# Patient Record
Sex: MALE | URBAN - METROPOLITAN AREA
[De-identification: names, ages, dates, MRNs, and addresses within clinical notes are randomized per-mention and may not be internally consistent; named-entity substitution may affect disease eponyms.]

---

## 2020-12-20 ENCOUNTER — NURSE TRIAGE (OUTPATIENT)
Dept: OTHER | Facility: OTHER | Age: 40
End: 2020-12-20

## 2020-12-20 DIAGNOSIS — Z20.822 COVID-19 RULED OUT: Primary | ICD-10-CM

## 2020-12-21 DIAGNOSIS — Z20.822 COVID-19 RULED OUT: ICD-10-CM

## 2020-12-21 PROCEDURE — U0003 INFECTIOUS AGENT DETECTION BY NUCLEIC ACID (DNA OR RNA); SEVERE ACUTE RESPIRATORY SYNDROME CORONAVIRUS 2 (SARS-COV-2) (CORONAVIRUS DISEASE [COVID-19]), AMPLIFIED PROBE TECHNIQUE, MAKING USE OF HIGH THROUGHPUT TECHNOLOGIES AS DESCRIBED BY CMS-2020-01-R: HCPCS | Performed by: FAMILY MEDICINE

## 2020-12-22 LAB — SARS-COV-2 RNA SPEC QL NAA+PROBE: DETECTED

## 2020-12-29 ENCOUNTER — TELEPHONE (OUTPATIENT)
Dept: CARDIOLOGY CLINIC | Facility: CLINIC | Age: 40
End: 2020-12-29

## 2022-11-10 ENCOUNTER — OFFICE VISIT (OUTPATIENT)
Dept: URGENT CARE | Facility: CLINIC | Age: 42
End: 2022-11-10

## 2022-11-10 VITALS
HEIGHT: 72 IN | OXYGEN SATURATION: 96 % | SYSTOLIC BLOOD PRESSURE: 125 MMHG | HEART RATE: 86 BPM | BODY MASS INDEX: 33.86 KG/M2 | DIASTOLIC BLOOD PRESSURE: 81 MMHG | RESPIRATION RATE: 16 BRPM | TEMPERATURE: 96.5 F | WEIGHT: 250 LBS

## 2022-11-10 DIAGNOSIS — J02.9 PHARYNGITIS, UNSPECIFIED ETIOLOGY: Primary | ICD-10-CM

## 2022-11-10 DIAGNOSIS — K21.9 GASTROESOPHAGEAL REFLUX DISEASE WITHOUT ESOPHAGITIS: ICD-10-CM

## 2022-11-10 DIAGNOSIS — H10.33 ACUTE BACTERIAL CONJUNCTIVITIS OF BOTH EYES: ICD-10-CM

## 2022-11-10 LAB — S PYO AG THROAT QL: NEGATIVE

## 2022-11-10 RX ORDER — TOBRAMYCIN 3 MG/ML
2 SOLUTION/ DROPS OPHTHALMIC
Qty: 5 ML | Refills: 0 | Status: SHIPPED | OUTPATIENT
Start: 2022-11-10 | End: 2022-11-17

## 2022-11-10 RX ORDER — AMOXICILLIN 500 MG/1
500 CAPSULE ORAL EVERY 12 HOURS SCHEDULED
Qty: 14 CAPSULE | Refills: 0 | Status: SHIPPED | OUTPATIENT
Start: 2022-11-10 | End: 2022-11-17

## 2022-11-10 RX ORDER — TOBRAMYCIN 3 MG/ML
2 SOLUTION/ DROPS OPHTHALMIC
Qty: 5 ML | Refills: 0 | Status: SHIPPED | COMMUNITY
Start: 2022-11-10 | End: 2022-11-10

## 2022-11-10 NOTE — PROGRESS NOTES
[facility]        NAME: Radha Pop is a 38 y o  male  : 1980    MRN: [id number]  DATE: November 10, 2022  TIME: 11:02 AM    Assessment and Plan   Pharyngitis, unspecified etiology [J02 9]  1  Pharyngitis, unspecified etiology  POCT rapid strepA    amoxicillin (AMOXIL) 500 mg capsule    al mag oxide-diphenhydramine-lidocaine viscous (MAGIC MOUTHWASH) 1:1:1 suspension   2  Acute bacterial conjunctivitis of both eyes  tobramycin (TOBREX) 0 3 % SOLN    DISCONTINUED: tobramycin (TOBREX) 0 3 % SOLN   3  Gastroesophageal reflux disease without esophagitis       Discussed pharyngitis due to bacterial, allergies, or reflux  Patient Instructions   Medications as prescribed  Warm compress  Your consider contagious until you have been on antibiotics for 24 hours  Saltwater gargles  Warm tea with honey  Consider allergy and reflux medications  Reestablish care with PCP  Follow up with PCP in 3-5 days  Proceed to  ER if symptoms worsen  Chief Complaint     Chief Complaint   Patient presents with   • Sore Throat     Sore throat for past 7 days; placed on 3 days of steroids last dose was Saturday AM; pt states lymph nodes are swollen and it hurts to swallow   • Eye Problem     Bilateral itchy eyes, reddened, with discharge since last night         History of Present Illness       Patient is a 68-year-old male with no significant past medical history presents the office complaining of sore throat for 7 days  Pain is rated 7/10  States the pain has been providing him from sleeping at night     Denies fevers, congestion, rhinorrhea, nausea, vomiting, abdominal pain, or rashes  Patient does admit to clearing his throat, mild reflux, and belching  No known history of GERD  He was seen at an urgent care a few days ago and given 3 days of steroids which did not provide any help      States he also woke up with bilateral itchy eyes, redness, discharge, pasted shut reports he had a cough for approximately 1-2 days  Denies photophobia, pain, vision changes, foreign body sensation  Patient wears glasses and contacts but has not wears contacts for 1 week      Review of Systems   Review of Systems   Constitutional: Negative for chills and fever  HENT: Positive for sore throat and trouble swallowing  Negative for congestion, postnasal drip and rhinorrhea  Eyes: Positive for discharge, redness and itching  Negative for photophobia, pain and visual disturbance  Respiratory: Negative for cough and shortness of breath  Cardiovascular: Negative for chest pain and palpitations  Gastrointestinal: Negative for abdominal pain, diarrhea, nausea and vomiting  Musculoskeletal: Negative for myalgias  Neurological: Negative for dizziness, light-headedness and headaches           Current Medications       Current Outpatient Medications:   •  al mag oxide-diphenhydramine-lidocaine viscous (MAGIC MOUTHWASH) 1:1:1 suspension, Swish and spit 10 mL every 4 (four) hours as needed for mouth pain or discomfort, Disp: 90 mL, Rfl: 0  •  amoxicillin (AMOXIL) 500 mg capsule, Take 1 capsule (500 mg total) by mouth every 12 (twelve) hours for 7 days, Disp: 14 capsule, Rfl: 0  •  tobramycin (TOBREX) 0 3 % SOLN, Administer 2 drops to both eyes every 4 (four) hours while awake for 7 days, Disp: 5 mL, Rfl: 0    Current Allergies     Allergies as of 11/10/2022   • (No Known Allergies)            The following portions of the patient's history were reviewed and updated as appropriate: allergies, current medications, past family history, past medical history, past social history, past surgical history and problem list      Past Medical History:   Diagnosis Date   • Otitis media    • Strep throat        Past Surgical History:   Procedure Laterality Date   • ANKLE SURGERY Right    • CHOLECYSTECTOMY     • HERNIA REPAIR     • TONSILLECTOMY         Family History   Problem Relation Age of Onset   • No Known Problems Mother    • No Known Problems Father          Medications have been verified  Objective   /81   Pulse 86   Temp (!) 96 5 °F (35 8 °C)   Resp 16   Ht 6' (1 829 m)   Wt 113 kg (250 lb)   SpO2 96%   BMI 33 91 kg/m²   No LMP for male patient  Physical Exam     Physical Exam  Vitals and nursing note reviewed  Constitutional:       Appearance: Normal appearance  He is well-developed  HENT:      Head: Normocephalic and atraumatic  Right Ear: Tympanic membrane, ear canal and external ear normal       Left Ear: Tympanic membrane, ear canal and external ear normal       Nose: Nose normal       Mouth/Throat:      Pharynx: Uvula midline  Posterior oropharyngeal erythema present  No pharyngeal swelling or uvula swelling  Tonsils: No tonsillar exudate or tonsillar abscesses  Eyes:      General: Lids are normal  Vision grossly intact  Right eye: Discharge present  Left eye: Discharge present  Extraocular Movements: Extraocular movements intact  Conjunctiva/sclera:      Right eye: Right conjunctiva is injected  Left eye: Left conjunctiva is injected  Pupils: Pupils are equal, round, and reactive to light  Cardiovascular:      Rate and Rhythm: Normal rate and regular rhythm  Heart sounds: Normal heart sounds  No murmur heard  No friction rub  No gallop  Pulmonary:      Effort: Pulmonary effort is normal       Breath sounds: Normal breath sounds  No stridor  No wheezing or rales  Musculoskeletal:         General: Normal range of motion  Cervical back: Neck supple  Lymphadenopathy:      Cervical: Cervical adenopathy present  Skin:     General: Skin is warm and dry  Capillary Refill: Capillary refill takes less than 2 seconds  Neurological:      Mental Status: He is alert         POCT rapid strep negative

## 2022-11-10 NOTE — PATIENT INSTRUCTIONS
GERD (Gastroesophageal Reflux Disease)   WHAT YOU NEED TO KNOW:   Gastroesophageal reflux disease (GERD) is reflux that happens more than 2 times a week for a few weeks  Reflux means acid and food in your stomach back up into your esophagus  GERD can cause other health problems over time if it is not treated  DISCHARGE INSTRUCTIONS:   Call your local emergency number (911 in the [address] [facility] [facility]) if:   You have severe chest pain and sudden trouble breathing  Return to the emergency department if:   You have trouble breathing after you vomit  You have trouble swallowing, or pain with swallowing  Your bowel movements are black, bloody, or tarry-looking  Your vomit looks like coffee grounds or has blood in it  Call your doctor or gastroenterologist if:   You feel full and cannot burp or vomit  You vomit large amounts, or you vomit often  You are losing weight without trying  Your symptoms get worse or do not improve with treatment  You have questions or concerns about your condition or care  Medicines:   Medicines  are used to decrease stomach acid  Medicine may also be used to help your lower esophageal sphincter and stomach contract (tighten) more  Take your medicine as directed  Contact your healthcare provider if you think your medicine is not helping or if you have side effects  Tell him of her if you are allergic to any medicine  Keep a list of the medicines, vitamins, and herbs you take  Include the amounts, and when and why you take them  Bring the list or the pill bottles to follow-up visits  Carry your medicine list with you in case of an emergency  Manage GERD:       Do not have foods or drinks that may increase heartburn  These include chocolate, peppermint, fried or fatty foods, drinks that contain caffeine, or carbonated drinks (soda)  Other foods include spicy foods, onions, tomatoes, and tomato-based foods   Do not have foods or drinks that can irritate your esophagus, such as citrus fruits, juices, and alcohol  Do not eat large meals  When you eat a lot of food at one time, your stomach needs more acid to digest it  Eat 6 small meals each day instead of 3 large ones, and eat slowly  Do not eat meals 2 to 3 hours before bedtime  Elevate the head of your bed  Place 6-inch blocks under the head of your bed frame  You may also use more than one pillow under your head and shoulders while you sleep  Maintain a healthy weight  If you are overweight, weight loss may help relieve symptoms of GERD  Do not smoke  Smoking weakens the lower esophageal sphincter and increases the risk of GERD  Ask your healthcare provider for information if you currently smoke and need help to quit  E-cigarettes or smokeless tobacco still contain nicotine  Talk to your healthcare provider before you use these products  Do not put pressure on your abdomen  Pressure pushes acid up into your esophagus  Do not wear clothing that is tight around your waist  Do not bend over  Bend at the knees if you need to pick something up  Follow up with your doctor or gastroenterologist as directed:  Write down your questions so you remember to ask them during your visits  © Copyright [employer] 2022 Information is for End User's use only and may not be sold, redistributed or otherwise used for commercial purposes  All illustrations and images included in CareNotes® are the copyrighted property of [employer]  or Isiah Chapman  The above information is an  only  It is not intended as medical advice for individual conditions or treatments  Talk to your doctor, nurse or pharmacist before following any medical regimen to see if it is safe and effective for you

## 2022-11-10 NOTE — LETTER
November 10, 2022     Patient: Radha Pop   YOB: 1980   Date of Visit: 11/10/2022       To Whom It May Concern: It is my medical opinion that Radha Pop should remain out of work until 10/14/2022             Sincerely,        Cedrick Etienne PA-C

## 2024-05-28 ENCOUNTER — OFFICE VISIT (OUTPATIENT)
Dept: URGENT CARE | Facility: CLINIC | Age: 44
End: 2024-05-28
Payer: COMMERCIAL

## 2024-05-28 VITALS
TEMPERATURE: 98 F | DIASTOLIC BLOOD PRESSURE: 85 MMHG | OXYGEN SATURATION: 96 % | HEART RATE: 68 BPM | WEIGHT: 244 LBS | SYSTOLIC BLOOD PRESSURE: 134 MMHG | RESPIRATION RATE: 18 BRPM | BODY MASS INDEX: 32.34 KG/M2 | HEIGHT: 73 IN

## 2024-05-28 DIAGNOSIS — R11.2 NAUSEA AND VOMITING, UNSPECIFIED VOMITING TYPE: Primary | ICD-10-CM

## 2024-05-28 PROCEDURE — 99213 OFFICE O/P EST LOW 20 MIN: CPT

## 2024-05-28 RX ORDER — ONDANSETRON 4 MG/1
4 TABLET, FILM COATED ORAL EVERY 8 HOURS PRN
Qty: 20 TABLET | Refills: 0 | Status: SHIPPED | OUTPATIENT
Start: 2024-05-28

## 2024-05-28 NOTE — LETTER
May 29, 2024     Patient: Miguel Angel Acevedo   YOB: 1980   Date of Visit: 5/28/2024       To Whom it May Concern:    Miguel Angel Acevedo was seen in my clinic on 5/28/2024. He may return to work on 05/30/2024 .    If you have any questions or concerns, please don't hesitate to call.         Sincerely,          TESSIE Edwards        CC: No Recipients

## 2024-05-28 NOTE — PATIENT INSTRUCTIONS
Take zofran as prescribed  Fluids (pedialyte) and rest  Broths and clear soups  BRAT diet (bananas, rice, applesauce, and toast)  Progress to normal diet as tolerated  Avoid dairy while symptoms persist  Tylenol for pain/fever    Follow up with PCP in 3-5 days.  Proceed to  ER if symptoms worsen.    If tests are performed, our office will contact you with results only if changes need to made to the care plan discussed with you at the visit. You can review your full results on [facility].    Acute Nausea and Vomiting   WHAT YOU NEED TO KNOW:   Acute means the nausea and vomiting starts suddenly, gets worse quickly, and lasts a short time. There are many possible causes of acute nausea and vomiting.  DISCHARGE INSTRUCTIONS:   Call your local emergency number (911 in the [place]) if:   You have chest pain.    You have severe pain or cramping in your abdomen.    Your vision is blurred.    You are confused, have a high fever, or a stiff neck.    You have bright red blood coming from your rectum.    Your vomit smells like bowel movement.    Return to the emergency department if:   You have a severe headache or pain.    You are dizzy, cold, and thirsty, and your eyes and mouth are dry.    You are urinating very little or not at all.    You are dizzy or lightheaded when you stand up.    You see blood or material that looks like coffee grounds in your vomit.    Call your doctor if:   You continue to vomit for more than 48 hours.    Your nausea and vomiting does not get better or go away after you use medicine.    You have questions or concerns about your condition or care.    Medicines:  You may need any of the following:  Medicines  may be given to calm your stomach and stop your vomiting. You may also need medicines to help empty your stomach and bowels.    Take your medicine as directed.  Contact your healthcare provider if you think your medicine is not helping or if you have side effects. Tell your provider if you  are allergic to any medicine. Keep a list of the medicines, vitamins, and herbs you take. Include the amounts, and when and why you take them. Bring the list or the pill bottles to follow-up visits. Carry your medicine list with you in case of an emergency.    Manage your symptoms:   Rest as much as you can.  Too much activity can make your nausea worse.    Drink more liquids to prevent dehydration.  Take small sips. Try drinks such as ginger ale, lemonade, water, or tea. Your provider may recommend that you drink an oral rehydration solution (ORS). ORS contains water, salts, and sugar that are needed to replace the lost body fluids.    Eat smaller meals, more often.  Try bland foods and avoid spices or strong flavors    Do not drink alcohol.  Alcohol may upset or irritate your stomach.    Follow up with your doctor as directed:  Write down your questions so you remember to ask them during your visits.  © Copyright [employer] 2023 Information is for End User's use only and may not be sold, redistributed or otherwise used for commercial purposes.  The above information is an  only. It is not intended as medical advice for individual conditions or treatments. Talk to your doctor, nurse or pharmacist before following any medical regimen to see if it is safe and effective for you.

## 2024-05-28 NOTE — LETTER
May 28, 2024     Patient: Miguel Angel Acevedo   YOB: 1980   Date of Visit: 5/28/2024       To Whom it May Concern:    Miguel Angel Acevedo was seen in my clinic on 5/28/2024. He may return to work on 05/29/2024 .    If you have any questions or concerns, please don't hesitate to call.         Sincerely,          TESSIE Edwards

## 2024-05-28 NOTE — PROGRESS NOTES
[facility]        NAME: Miguel Angel Acevedo is a 44 y.o. male  : 1980    MRN: [id number]  DATE: May 28, 2024  TIME: 10:20 AM    Assessment and Plan   Nausea and vomiting, unspecified vomiting type [R11.2]  1. Nausea and vomiting, unspecified vomiting type  ondansetron (ZOFRAN) 4 mg tablet        Will treat with zofran  Encouraged supportive care  Follow up with PCP    Patient Instructions     Take zofran as prescribed  Fluids (pedialyte) and rest  Broths and clear soups  BRAT diet (bananas, rice, applesauce, and toast)  Progress to normal diet as tolerated  Avoid dairy while symptoms persist  Tylenol for pain/fever    Follow up with PCP in 3-5 days.  Proceed to  ER if symptoms worsen.    If tests are performed, our office will contact you with results only if changes need to made to the care plan discussed with you at the visit. You can review your full results on [facility].    Chief Complaint     Chief Complaint   Patient presents with    Vomiting     Vomiting and diarrhea  x 1 day          History of Present Illness       44-year-old male arrives reporting nausea and 1 episode of vomiting yesterday.  Patient reports multiple episodes of diarrhea since yesterday.  Patient reports he was exposed to a friend who recently had similar symptoms.  Patient denies abdominal pain, denies chest pain, denies shortness of breath.  Patient denies any fevers.  Patient reports normal urination, no pain or penile discharge.  Patient reports he just generally feels rundown and was not able to go to work today.    Vomiting   This is a new problem. The current episode started yesterday. The problem has been unchanged. There has been no fever. Associated symptoms include diarrhea. Pertinent negatives include no abdominal pain, chest pain, chills, coughing, fever or headaches. Risk factors include ill contacts.       Review of Systems   Review of Systems   Constitutional:  Positive for fatigue. Negative for chills  "and fever.   HENT:  Negative for congestion.    Respiratory:  Negative for cough and shortness of breath.    Cardiovascular:  Negative for chest pain and palpitations.   Gastrointestinal:  Positive for diarrhea, nausea and vomiting. Negative for abdominal pain.   Genitourinary:  Negative for difficulty urinating, dysuria, flank pain, hematuria, penile pain, testicular pain and urgency.   Musculoskeletal:  Negative for back pain.   Skin:  Negative for color change and rash.   Neurological:  Negative for headaches.   All other systems reviewed and are negative.        Current Medications       Current Outpatient Medications:     ondansetron (ZOFRAN) 4 mg tablet, Take 1 tablet (4 mg total) by mouth every 8 (eight) hours as needed for nausea or vomiting, Disp: 20 tablet, Rfl: 0    al mag oxide-diphenhydramine-lidocaine viscous (MAGIC MOUTHWASH) 1:1:1 suspension, Swish and spit 10 mL every 4 (four) hours as needed for mouth pain or discomfort (Patient not taking: Reported on 5/28/2024), Disp: 90 mL, Rfl: 0    Current Allergies     Allergies as of 05/28/2024    (No Known Allergies)            The following portions of the patient's history were reviewed and updated as appropriate: allergies, current medications, past family history, past medical history, past social history, past surgical history and problem list.     Past Medical History:   Diagnosis Date    Otitis media     Strep throat        Past Surgical History:   Procedure Laterality Date    ANKLE SURGERY Right     CHOLECYSTECTOMY      HERNIA REPAIR      TONSILLECTOMY         Family History   Problem Relation Age of Onset    No Known Problems Mother     No Known Problems Father          Medications have been verified.        Objective   /85   Pulse 68   Temp 98 °F (36.7 °C) (Temporal)   Resp 18   Ht 6' 1\" (1.854 m)   Wt 111 kg (244 lb)   SpO2 96%   BMI 32.19 kg/m²        Physical Exam     Physical Exam  Constitutional:       General: He is not in acute " distress.     Appearance: Normal appearance. He is not ill-appearing.   HENT:      Head: Normocephalic.      Right Ear: Tympanic membrane normal.      Left Ear: Tympanic membrane normal.      Nose: No congestion or rhinorrhea.      Mouth/Throat:      Mouth: Mucous membranes are moist.      Pharynx: No oropharyngeal exudate or posterior oropharyngeal erythema.   Eyes:      Extraocular Movements: Extraocular movements intact.      Conjunctiva/sclera: Conjunctivae normal.      Pupils: Pupils are equal, round, and reactive to light.   Cardiovascular:      Rate and Rhythm: Normal rate and regular rhythm.      Pulses: Normal pulses.      Heart sounds: Normal heart sounds.   Pulmonary:      Effort: Pulmonary effort is normal.      Breath sounds: Normal breath sounds. No wheezing.   Abdominal:      General: Bowel sounds are normal. There is no distension.      Palpations: Abdomen is soft. There is no mass.      Tenderness: There is no abdominal tenderness. There is no right CVA tenderness, left CVA tenderness, guarding or rebound.      Hernia: No hernia is present.   Musculoskeletal:         General: Normal range of motion.      Cervical back: Normal range of motion and neck supple.   Lymphadenopathy:      Cervical: No cervical adenopathy.   Neurological:      General: No focal deficit present.      Mental Status: He is alert and oriented to person, place, and time.   Psychiatric:         Mood and Affect: Mood normal.         Behavior: Behavior normal.

## 2024-12-02 ENCOUNTER — OFFICE VISIT (OUTPATIENT)
Dept: URGENT CARE | Facility: CLINIC | Age: 44
End: 2024-12-02
Payer: COMMERCIAL

## 2024-12-02 VITALS
WEIGHT: 259.8 LBS | BODY MASS INDEX: 34.43 KG/M2 | HEIGHT: 73 IN | HEART RATE: 88 BPM | SYSTOLIC BLOOD PRESSURE: 143 MMHG | RESPIRATION RATE: 18 BRPM | TEMPERATURE: 97.6 F | DIASTOLIC BLOOD PRESSURE: 80 MMHG | OXYGEN SATURATION: 94 %

## 2024-12-02 DIAGNOSIS — J02.9 ACUTE PHARYNGITIS, UNSPECIFIED ETIOLOGY: Primary | ICD-10-CM

## 2024-12-02 PROCEDURE — G0382 LEV 3 HOSP TYPE B ED VISIT: HCPCS

## 2024-12-02 PROCEDURE — S9083 URGENT CARE CENTER GLOBAL: HCPCS

## 2024-12-02 NOTE — PROGRESS NOTES
[facility]        NAME: Miguel Angel Acevedo is a 44 y.o. male  : 1980    MRN: [id number]  DATE: 2024  TIME: 8:40 AM    Assessment and Plan   Acute pharyngitis, unspecified etiology [J02.9]  1. Acute pharyngitis, unspecified etiology          Physical examination unremarkable; strep pharyngitis unlikely. Symptoms likely viral in nature. Supportive measures to be used at home. Centor Score = 1.     Patient Instructions     Fluids and rest  Salt water gargles and chloraseptic spray  Throat Coat Tea  Wash hands frequently  Don't share drinks  Tylenol/Ibuprofen for pain/fever     Cough/Cold Medications Made Easy!:     - Guaifenesin (Mucinex): This medication is a mucus thinning agent. It's good for congestion of the sinuses or chest, and works best if you drink plenty of fluids with it.    -Dextromethorphan (Delsym, Mucinex DM): This medication is a cough suppressant.    -Pseudoephedrine (Sudaphed): This medication is a decongestant. Patients who have high blood pressure or heart related conditions should not use this medication! Coricidin HBP is a great and safe alternative for patients with these conditions.     -Flonase: This medication is an intranasal steroid and works well for sinus congestion and postnasal drip.    -Antihistamines (Claritin, Zyrtec, Allegra, Benadryl): These medications are used for allergies, but can also be used for colds to help treat congestion and ear fullness.     - May also use saline nasal mist/sprays and VapoRub/Vix!     Follow up with PCP in 3-5 days.  Proceed to  ER if symptoms worsen.    If tests have been performed at [facility], our office will contact you with results if changes need to be made to the care plan discussed with you at the visit.  You can review your full results on [facility].    Chief Complaint     Chief Complaint   Patient presents with    Sore Throat     Patient has had a sore throat since Saturday morning. No difficulty swallowing.  Denies fevers. Patient states he took NyQuil with no relief. Patient states he also began with a headache located behind his right eye causing him difficulty sleeping.          History of Present Illness       Began feeling sick Saturday morning (x3 days ago)  States daughters have recently been sick with similar symptoms   Eating & Drinking well   Trouble sleeping last night d/t HA - no hx of headaches - states that this has resolved.     Sore Throat   This is a new problem. The current episode started in the past 7 days. The problem has been unchanged. The pain is worse on the right side. There has been no fever. The pain is at a severity of 4/10. The pain is mild. Associated symptoms include headaches and a hoarse voice. Pertinent negatives include no abdominal pain, congestion, coughing, ear pain, neck pain, shortness of breath, trouble swallowing or vomiting. He has had no exposure to strep or mono. Treatments tried: Nyquil. The treatment provided no relief.       Review of Systems   Review of Systems   Constitutional:  Negative for fever.   HENT:  Positive for hoarse voice and sore throat. Negative for congestion, ear pain, rhinorrhea and trouble swallowing.    Respiratory:  Negative for cough and shortness of breath.    Cardiovascular:  Negative for chest pain.   Gastrointestinal:  Negative for abdominal pain, nausea and vomiting.   Musculoskeletal:  Negative for myalgias and neck pain.   Skin:  Negative for color change, pallor and rash.   Neurological:  Positive for headaches. Negative for weakness.   Hematological:  Negative for adenopathy.         Current Medications       Current Outpatient Medications:     al mag oxide-diphenhydramine-lidocaine viscous (MAGIC MOUTHWASH) 1:1:1 suspension, Swish and spit 10 mL every 4 (four) hours as needed for mouth pain or discomfort (Patient not taking: Reported on 12/2/2024), Disp: 90 mL, Rfl: 0    ondansetron (ZOFRAN) 4 mg tablet, Take 1 tablet (4 mg total) by mouth  "every 8 (eight) hours as needed for nausea or vomiting (Patient not taking: Reported on 12/2/2024), Disp: 20 tablet, Rfl: 0    Current Allergies     Allergies as of 12/02/2024    (No Known Allergies)            The following portions of the patient's history were reviewed and updated as appropriate: allergies, current medications, past family history, past medical history, past social history, past surgical history and problem list.     Past Medical History:   Diagnosis Date    Otitis media     Strep throat        Past Surgical History:   Procedure Laterality Date    ANKLE SURGERY Right     CHOLECYSTECTOMY      HERNIA REPAIR      TONSILLECTOMY         Family History   Problem Relation Age of Onset    No Known Problems Mother     No Known Problems Father          Medications have been verified.        Objective   /80   Pulse 88   Temp 97.6 °F (36.4 °C) (Tympanic)   Resp 18   Ht 6' 1\" (1.854 m)   Wt 118 kg (259 lb 12.8 oz)   SpO2 94%   BMI 34.28 kg/m²   No LMP for male patient.       Physical Exam     Physical Exam  Vitals and nursing note reviewed.   Constitutional:       General: He is not in acute distress.     Appearance: Normal appearance. He is ill-appearing. He is not toxic-appearing.   HENT:      Head: Normocephalic and atraumatic.      Right Ear: Tympanic membrane, ear canal and external ear normal.      Left Ear: Tympanic membrane, ear canal and external ear normal.      Nose: Nose normal. No congestion or rhinorrhea.      Mouth/Throat:      Mouth: Mucous membranes are moist.      Pharynx: Oropharynx is clear. No oropharyngeal exudate or posterior oropharyngeal erythema.   Eyes:      General:         Right eye: No discharge.         Left eye: No discharge.      Extraocular Movements: Extraocular movements intact.      Conjunctiva/sclera: Conjunctivae normal.      Pupils: Pupils are equal, round, and reactive to light.   Cardiovascular:      Rate and Rhythm: Normal rate and regular rhythm.      " Pulses: Normal pulses.      Heart sounds: Normal heart sounds.   Pulmonary:      Effort: Pulmonary effort is normal. No respiratory distress.      Breath sounds: Normal breath sounds. No stridor. No wheezing, rhonchi or rales.   Chest:      Chest wall: No tenderness.   Abdominal:      General: Abdomen is flat.      Palpations: Abdomen is soft.   Musculoskeletal:         General: Normal range of motion.      Cervical back: Normal range of motion and neck supple.   Lymphadenopathy:      Cervical: No cervical adenopathy.   Skin:     General: Skin is warm.      Capillary Refill: Capillary refill takes less than 2 seconds.      Coloration: Skin is not jaundiced or pale.      Findings: No bruising, erythema, lesion or rash.   Neurological:      General: No focal deficit present.      Mental Status: He is alert. Mental status is at baseline.   Psychiatric:         Mood and Affect: Mood normal.         Behavior: Behavior normal.         Thought Content: Thought content normal.         Judgment: Judgment normal.

## 2024-12-02 NOTE — LETTER
December 2, 2024     Patient: Miguel Angel Acevedo   YOB: 1980   Date of Visit: 12/2/2024       To Whom it May Concern:    Miguel Angel Acevedo was seen in my clinic on 12/2/2024. He may return to work on 12/3/24 .    If you have any questions or concerns, please don't hesitate to call.         Sincerely,          TESSEI Hernandez

## 2024-12-02 NOTE — PATIENT INSTRUCTIONS
Fluids and rest  Salt water gargles and chloraseptic spray  Throat Coat Tea  Wash hands frequently  Don't share drinks  Tylenol/Ibuprofen for pain/fever     Cough/Cold Medications Made Easy!:     - Guaifenesin (Mucinex): This medication is a mucus thinning agent. It's good for congestion of the sinuses or chest, and works best if you drink plenty of fluids with it.    -Dextromethorphan (Delsym, Mucinex DM): This medication is a cough suppressant.    -Pseudoephedrine (Sudaphed): This medication is a decongestant. Patients who have high blood pressure or heart related conditions should not use this medication! Coricidin HBP is a great and safe alternative for patients with these conditions.     -Flonase: This medication is an intranasal steroid and works well for sinus congestion and postnasal drip.    -Antihistamines (Claritin, Zyrtec, Allegra, Benadryl): These medications are used for allergies, but can also be used for colds to help treat congestion and ear fullness.     - May also use saline nasal mist/sprays and VapoRub/Vix!     Follow up with PCP in 3-5 days.  Proceed to  ER if symptoms worsen.    If tests have been performed at [facility], our office will contact you with results if changes need to be made to the care plan discussed with you at the visit.  You can review your full results on [facility] MyChart.